# Patient Record
Sex: FEMALE | Race: WHITE | NOT HISPANIC OR LATINO | Employment: UNEMPLOYED | URBAN - METROPOLITAN AREA
[De-identification: names, ages, dates, MRNs, and addresses within clinical notes are randomized per-mention and may not be internally consistent; named-entity substitution may affect disease eponyms.]

---

## 2023-06-16 ENCOUNTER — HOSPITAL ENCOUNTER (EMERGENCY)
Facility: HOSPITAL | Age: 18
Discharge: HOME | End: 2023-06-16
Payer: MEDICAID

## 2023-06-16 VITALS
TEMPERATURE: 98.4 F | HEIGHT: 69 IN | OXYGEN SATURATION: 95 % | SYSTOLIC BLOOD PRESSURE: 150 MMHG | HEART RATE: 111 BPM | DIASTOLIC BLOOD PRESSURE: 95 MMHG | WEIGHT: 293 LBS | BODY MASS INDEX: 43.4 KG/M2 | RESPIRATION RATE: 16 BRPM

## 2023-06-16 DIAGNOSIS — R68.84 JAW PAIN: Primary | ICD-10-CM

## 2023-06-16 DIAGNOSIS — K08.89 PAIN, DENTAL: ICD-10-CM

## 2023-06-16 RX ORDER — AMOXICILLIN 500 MG/1
1000 CAPSULE ORAL 3 TIMES DAILY
Qty: 42 CAPSULE | Refills: 0 | Status: SHIPPED | OUTPATIENT
Start: 2023-06-16 | End: 2023-06-23

## 2023-06-16 RX ORDER — NAPROXEN 500 MG/1
500 TABLET ORAL 2 TIMES DAILY PRN
Qty: 14 TABLET | Refills: 0 | Status: SHIPPED | OUTPATIENT
Start: 2023-06-16 | End: 2023-06-23

## 2023-06-16 RX ADMIN — DIPHENHYDRAMINE HYDROCHLORIDE: 12.5 LIQUID ORAL at 19:48

## 2023-06-16 NOTE — DISCHARGE INSTRUCTIONS
Take naproxen as needed for pain.  Do not mix with ibuprofen, Aleve, Mobic or diclofenac.  Take amoxicillin antibiotics to completion.    Follow-up with dentist for recheck.  Contact one of the providers listed on the handout.  Return to the ER for new or worsening symptoms

## 2023-06-16 NOTE — ED NOTES
Pt reports her tooth breaking and falling out- it was red/brown and rubbery but she could tell it was a tooth.

## 2023-06-16 NOTE — ED PROVIDER NOTES
Subjective     Provider in Triage Note  Patient is a 18-year-old  female with no past medical history presents to the ER with complaints of dental pain today for a couple days.  Patient states that part of her teeth are falling out.  She reports that she lost one of her molars the other day after eating.  She denies any facial swelling or redness.  No fever or chills.  No difficulty breathing or swallowing.  Patient states she does not have insurance so she was not able to follow-up with a dentist or primary care provider.    History of Present Illness  Agree with above pit note    History provided by:  Patient    Review of Systems   Constitutional:  Negative for fever.   HENT:  Positive for dental problem. Negative for facial swelling, sore throat and trouble swallowing.    Respiratory:  Negative for shortness of breath and wheezing.    Cardiovascular:  Negative for chest pain.   Gastrointestinal:  Negative for abdominal pain.   Genitourinary:  Negative for dysuria.   Skin:  Negative for rash.   Neurological:  Negative for headaches.   Psychiatric/Behavioral:  Negative for behavioral problems.    All other systems reviewed and are negative.    No past medical history on file.    No Known Allergies    No past surgical history on file.    No family history on file.    Social History     Socioeconomic History    Marital status: Single           Objective   Physical Exam  Vitals and nursing note reviewed.   Constitutional:       Appearance: Normal appearance. She is well-developed. She is obese. She is not ill-appearing or toxic-appearing.   HENT:      Head: Normocephalic and atraumatic.      Mouth/Throat:      Mouth: Mucous membranes are moist.      Pharynx: Oropharynx is clear. No oropharyngeal exudate.      Comments: Oropharynx clear, not erythematous.  Patient does have missing molar back left and upper left with no obvious dental abscess    No obvious lesions.  No Ludewig's angina  Eyes:      Pupils:  "Pupils are equal, round, and reactive to light.   Cardiovascular:      Rate and Rhythm: Normal rate and regular rhythm.      Pulses: Normal pulses.      Heart sounds: Normal heart sounds.   Pulmonary:      Effort: Pulmonary effort is normal. No respiratory distress.      Breath sounds: Normal breath sounds. No wheezing.   Skin:     General: Skin is warm and dry.      Capillary Refill: Capillary refill takes less than 2 seconds.      Findings: No rash.   Neurological:      General: No focal deficit present.      Mental Status: She is alert and oriented to person, place, and time.      Cranial Nerves: No cranial nerve deficit.   Psychiatric:         Mood and Affect: Mood normal.         Behavior: Behavior normal.       Procedures           ED Course    /95 (BP Location: Right arm, Patient Position: Sitting)   Pulse 111   Temp 98.4 °F (36.9 °C) (Oral)   Resp 16   Ht 175.3 cm (69\")   Wt (!) 154 kg (340 lb)   LMP  (Within Months)   SpO2 95%   BMI 50.21 kg/m²   Labs Reviewed - No data to display  Medications   dental ball oral suspension with diphenhydrAMINE ( [facility] Given 6/16/23 1948)     No radiology results for the last day                                         Medical Decision Making  Patient with above evaluation.  Patient afebrile, nontoxic.  In no acute distress.  Physical exam is unremarkable, there is no evidence of dental abscess laceration or suspicion of Felicia's angina.  Patient given dental or solution for pain.  She was discharged with prescription for amoxicillin.  She is also given dental handout sheet for appropriate follow-up.  I see no indication for admission at this time.    Discharge plan and instructions were discussed with the patient who verbalized understanding and is in agreement with the plan, all questions were answered at this time.  Patient is aware of signs symptoms that would require immediate return to the emergency room.  Patient understands importance of " following up with primary care provider for further evaluation and worsening concerns as well as blood pressure recheck in the next 4 weeks.    Patient was discharged in improved stable condition with an upright steady gait.    Patient is aware that discharge does not mean that nothing is wrong but indicates no emergencies present and they must continue care with follow-up as given below or physician of her choice.    Problems Addressed:  Jaw pain: complicated acute illness or injury  Pain, dental: complicated acute illness or injury    Risk  Prescription drug management.        Final diagnoses:   Jaw pain   Pain, dental       ED Disposition  ED Disposition       ED Disposition   Discharge    Condition   Stable    Comment   --               PATIENT CONNECTION - [facility] [address]  [phone]  Schedule an appointment as soon as possible for a visit in 2 days  As needed, If symptoms worsen         Medication List        New Prescriptions      amoxicillin 500 MG capsule  Commonly known as: AMOXIL  Take 2 capsules by mouth 3 (Three) Times a Day for 7 days.     naproxen 500 MG EC tablet  Commonly known as: EC NAPROSYN  Take 1 tablet by mouth 2 (Two) Times a Day As Needed for Mild Pain for up to 7 days.               Where to Get Your Medications        These medications were sent to [facility]/pharmacy #[id number] - [facility] [place] - [address] [facility] - [phone]  - [phone]   [address] [facility] [place] [address]      Phone: [phone]   amoxicillin 500 MG capsule  naproxen 500 MG EC tablet            Antionette Camacho PA  06/16/23 6280

## 2025-08-08 ENCOUNTER — HOSPITAL ENCOUNTER (EMERGENCY)
Facility: HOSPITAL | Age: 20
Discharge: HOME | End: 2025-08-08
Attending: EMERGENCY MEDICINE
Payer: MEDICAID

## 2025-08-08 VITALS
HEART RATE: 89 BPM | BODY MASS INDEX: 41.02 KG/M2 | SYSTOLIC BLOOD PRESSURE: 119 MMHG | RESPIRATION RATE: 18 BRPM | HEIGHT: 71 IN | WEIGHT: 293 LBS | DIASTOLIC BLOOD PRESSURE: 57 MMHG | OXYGEN SATURATION: 100 % | TEMPERATURE: 98.1 F

## 2025-08-08 DIAGNOSIS — T23.201A PARTIAL THICKNESS BURN OF MULTIPLE SITES OF RIGHT HAND, INITIAL ENCOUNTER: Primary | ICD-10-CM

## 2025-08-08 PROCEDURE — 99283 EMERGENCY DEPT VISIT LOW MDM: CPT | Performed by: EMERGENCY MEDICINE

## 2025-08-08 PROCEDURE — 99282 EMERGENCY DEPT VISIT SF MDM: CPT

## 2025-08-08 RX ORDER — MUPIROCIN 2 %
1 OINTMENT (GRAM) TOPICAL 3 TIMES DAILY
Qty: 15 G | Refills: 0 | Status: SHIPPED | OUTPATIENT
Start: 2025-08-08 | End: 2025-08-13

## 2025-08-08 RX ORDER — DIAPER,BRIEF,INFANT-TODD,DISP
1 EACH MISCELLANEOUS ONCE
Status: COMPLETED | OUTPATIENT
Start: 2025-08-08 | End: 2025-08-08

## 2025-08-08 RX ORDER — CEPHALEXIN 500 MG/1
500 CAPSULE ORAL 4 TIMES DAILY
Qty: 28 CAPSULE | Refills: 0 | Status: SHIPPED | OUTPATIENT
Start: 2025-08-08 | End: 2025-08-15

## 2025-08-08 RX ADMIN — BACITRACIN 0.9 G: 500 OINTMENT TOPICAL at 23:22
